# Patient Record
Sex: FEMALE | Race: WHITE | NOT HISPANIC OR LATINO | URBAN - METROPOLITAN AREA
[De-identification: names, ages, dates, MRNs, and addresses within clinical notes are randomized per-mention and may not be internally consistent; named-entity substitution may affect disease eponyms.]

---

## 2022-05-24 ENCOUNTER — APPOINTMENT (OUTPATIENT)
Age: 27
Setting detail: DERMATOLOGY
End: 2022-05-24

## 2022-05-24 ENCOUNTER — APPOINTMENT (RX ONLY)
Age: 27
Setting detail: DERMATOLOGY
End: 2022-05-24

## 2022-05-24 VITALS
SYSTOLIC BLOOD PRESSURE: 120 MMHG | DIASTOLIC BLOOD PRESSURE: 70 MMHG | DIASTOLIC BLOOD PRESSURE: 70 MMHG | SYSTOLIC BLOOD PRESSURE: 120 MMHG

## 2022-05-24 DIAGNOSIS — L81.4 OTHER MELANIN HYPERPIGMENTATION: ICD-10-CM

## 2022-05-24 DIAGNOSIS — L81.1 CHLOASMA: ICD-10-CM

## 2022-05-24 PROCEDURE — ? TREATMENT REGIMEN

## 2022-05-24 PROCEDURE — ? PRESCRIPTION

## 2022-05-24 PROCEDURE — 99203 OFFICE O/P NEW LOW 30 MIN: CPT

## 2022-05-24 PROCEDURE — ? COUNSELING

## 2022-05-24 RX ORDER — FLUOCINOLONE ACETONIDE, HYDROQUINONE, AND TRETINOIN .1; 40; .5 MG/G; MG/G; MG/G
1 CREAM TOPICAL QHS
Qty: 30 | Refills: 1 | Status: ERX | COMMUNITY
Start: 2022-05-24

## 2022-05-24 RX ADMIN — FLUOCINOLONE ACETONIDE, HYDROQUINONE, AND TRETINOIN 1: .1; 40; .5 CREAM TOPICAL at 00:00

## 2022-05-24 ASSESSMENT — LOCATION SIMPLE DESCRIPTION DERM
LOCATION SIMPLE: UPPER LIP
LOCATION SIMPLE: RIGHT CHEEK
LOCATION SIMPLE: RIGHT FOREHEAD
LOCATION SIMPLE: UPPER LIP
LOCATION SIMPLE: RIGHT FOREHEAD
LOCATION SIMPLE: LEFT CHEEK
LOCATION SIMPLE: RIGHT CHEEK
LOCATION SIMPLE: LEFT CHEEK

## 2022-05-24 ASSESSMENT — LOCATION ZONE DERM
LOCATION ZONE: LIP
LOCATION ZONE: FACE
LOCATION ZONE: LIP
LOCATION ZONE: FACE

## 2022-05-24 ASSESSMENT — LOCATION DETAILED DESCRIPTION DERM
LOCATION DETAILED: LEFT CENTRAL MALAR CHEEK
LOCATION DETAILED: RIGHT INFERIOR CENTRAL MALAR CHEEK
LOCATION DETAILED: RIGHT MEDIAL FOREHEAD
LOCATION DETAILED: RIGHT INFERIOR CENTRAL MALAR CHEEK
LOCATION DETAILED: RIGHT MEDIAL FOREHEAD
LOCATION DETAILED: PHILTRUM
LOCATION DETAILED: LEFT INFERIOR MEDIAL MALAR CHEEK
LOCATION DETAILED: LEFT INFERIOR MEDIAL MALAR CHEEK
LOCATION DETAILED: LEFT CENTRAL MALAR CHEEK
LOCATION DETAILED: PHILTRUM

## 2022-05-24 NOTE — HPI: DISCOLORATION
How Severe Is Your Skin Discoloration?: mild
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

## 2022-08-26 ENCOUNTER — APPOINTMENT (RX ONLY)
Age: 27
Setting detail: DERMATOLOGY
End: 2022-08-26

## 2022-08-26 ENCOUNTER — APPOINTMENT (OUTPATIENT)
Age: 27
Setting detail: DERMATOLOGY
End: 2022-08-26

## 2022-08-26 VITALS
SYSTOLIC BLOOD PRESSURE: 100 MMHG | DIASTOLIC BLOOD PRESSURE: 70 MMHG | SYSTOLIC BLOOD PRESSURE: 100 MMHG | DIASTOLIC BLOOD PRESSURE: 70 MMHG

## 2022-08-26 DIAGNOSIS — Z41.9 ENCOUNTER FOR PROCEDURE FOR PURPOSES OTHER THAN REMEDYING HEALTH STATE, UNSPECIFIED: ICD-10-CM

## 2022-08-26 DIAGNOSIS — L81.1 CHLOASMA: ICD-10-CM | Status: RESOLVING

## 2022-08-26 PROCEDURE — ? COUNSELING

## 2022-08-26 PROCEDURE — ? TREATMENT REGIMEN

## 2022-08-26 PROCEDURE — ? PRODUCT LINE (ZO SKIN HEALTH)

## 2022-08-26 PROCEDURE — ? PRESCRIPTION

## 2022-08-26 PROCEDURE — 99213 OFFICE O/P EST LOW 20 MIN: CPT

## 2022-08-26 RX ORDER — HYDROQUINONE 4% 4 G/100G
EMULSION TOPICAL BID
Qty: 2 | Refills: 0 | Status: ACTIVE

## 2022-08-26 RX ORDER — TRETIONIN 0.5 MG/G
CREAM TOPICAL QHS
Qty: 20 | Refills: 0 | Status: ACTIVE

## 2022-08-26 ASSESSMENT — LOCATION DETAILED DESCRIPTION DERM
LOCATION DETAILED: RIGHT INFERIOR CENTRAL MALAR CHEEK
LOCATION DETAILED: INFERIOR MID FOREHEAD
LOCATION DETAILED: RIGHT CENTRAL MALAR CHEEK
LOCATION DETAILED: INFERIOR MID FOREHEAD
LOCATION DETAILED: LEFT CENTRAL MALAR CHEEK
LOCATION DETAILED: RIGHT INFERIOR CENTRAL MALAR CHEEK
LOCATION DETAILED: RIGHT CENTRAL MALAR CHEEK
LOCATION DETAILED: LEFT CENTRAL MALAR CHEEK

## 2022-08-26 ASSESSMENT — LOCATION SIMPLE DESCRIPTION DERM
LOCATION SIMPLE: RIGHT CHEEK
LOCATION SIMPLE: INFERIOR FOREHEAD
LOCATION SIMPLE: RIGHT CHEEK
LOCATION SIMPLE: RIGHT CHEEK
LOCATION SIMPLE: LEFT CHEEK
LOCATION SIMPLE: LEFT CHEEK
LOCATION SIMPLE: INFERIOR FOREHEAD
LOCATION SIMPLE: RIGHT CHEEK

## 2022-08-26 ASSESSMENT — LOCATION ZONE DERM
LOCATION ZONE: FACE

## 2022-08-26 NOTE — PROCEDURE: PRODUCT LINE ([facility])
Product 32 Price (In Dollars - Numeric Only, No Special Characters Or $): 0.00
Product 12 Units: 0
Name Of Product 18: ZO SPF Primer
Name Of Product 10: Complexion Renewal Kit
Product 1 Units: 1
Name Of Product 35: Tretinoin 0.1%
Product 1 Application Directions: Wash BID
Product 37 Application Directions: Apply [facility]
Name Of Product 4: Pigment control HQ4% RX
Name Of Product 27: SPF Brush 40 medium
Product 29 Application Directions: Apply to entire face QAM and reapply as needed.
Name Of Product 21: Oil Control Pads
Assigning Risk Information: Per AMA, level of risk is based upon consequences of the problem(s) addressed at the encounter when appropriately treated. Risk also includes medical decision making related to the need to initiate or forego further testing, treatment and/or hospitalization. Over the counter medication are assigned a risk level of low. Prescription medication management is assigned a risk level of moderate.
Name Of Product 2: Exfoliating polish
Product 15 Application Directions: Apply to face every night
Product 32 Application Directions: Apply to entire face QOS or alternating with Retinol
Name Of Product 13: Radical night repair
Name Of Product 30: Growth Factor Serum Eye
Risk Of Complication Category: Moderate (Prescription Medication Management)
Product 7 Application Directions: Apply to entire face BID
Product 24 Application Directions: Apply to entire face BID after cleansing
Name Of Product 33: Complexion clearing mask
Product 18 Application Directions: Apply to entire face QAM
Name Of Product 16: Wrinkle Texture Repair
Name Of Product 8: Exfoliating accelerator
Product 35 Application Directions: Apply to entire face QHS PRN
Allow Plan To Count Towards E/M Coding: No (this will remove associated impression from E/M calculation)
Product 10 Application Directions: Apply to entire face BID as directed
Product 21 Application Directions: Swab BID after cleansing
Product 4 Application Directions: Apply to entire face for pigmentation QAM
Name Of Product 25: Hydrafirm / Brightening Eye Cream
Name Of Product 19: Tretinoin 0.05% (20g)
Product 2 Application Directions: 3 to 5 x week after cleansing
Name Of Product 5: Vitamin C serum
Name Of Product 28: Hydrating Cream
Product 13 Application Directions: Apply to entire face QHS after cleansing
Name Of Product 36: Enzymatic peel
Name Of Product 11: Firming Serum
Product 22 Application Directions: Apply to eyelids and crows feet BID
Name Of Product 22: Intense Eye Repair
Send Charges To Patient Encounter: No
Product 30 Application Directions: Apply BID to periorbital
Product 16 Application Directions: Apply to entire face QHS as tolerated
Product 33 Application Directions: 1-2 x week to T-zone
Name Of Product 14: Dual Action Scrub
Name Of Product 3: Rozatrol
Name Of Product 31: Wrinkle and Texture Repair
Product 8 Application Directions: Apply QAM
Name Of Product 23: Gentle cleanser travel size
Product 25 Application Directions: Apply to periorbital region QHS
Name Of Product 34: Illuminating AOX Serum
Product 19 Application Directions: Apply 2 x 3 nights a week as tolerated
Name Of Product 17: Complexion renewal pads
Name Of Product 9: Pigment HQ4% blending RX
Product 36 Application Directions: QHS as tolerated
Product 11 Application Directions: Apply BID
Product 28 Application Directions: Apply to entire face QOS PRN alternating with Retin A
Name Of Product 20: Retinol Brightener
Name Of Product 26: Kellieve
Name Of Product 1: Gentle cleanser
Name Of Product 37: Renewal cream
Product 14 Application Directions: Scrub Every other day after cleansing
Name Of Product 29: ZO Broad SPF 50
Name Of Product 12: Exfoliating polish travel size
Name Of Product 6: Daily Power Defense
Product 6 Application Directions: Apply to entire face twice daily after pads.
Product 23 Application Directions: Cleanse face BID
Name Of Product 15: Growth Factor Serum
Product 3 Application Directions: Apply to entire face BID after Daily Power Defense
Product 34 Application Directions: Apply to entire face over DPD and right before SPF
Name Of Product 7: Instant Pore refiner
Detail Level: Zone
Name Of Product 24: Calming toner
Product 26 Application Directions: Apply to entire face BID after pads
Product 20 Application Directions: Apply to entire face QHS

## 2022-08-26 NOTE — PROCEDURE: PRODUCT LINE ([facility])
Product 2 Price (In Dollars - Numeric Only, No Special Characters Or $): 0.00
Allow Plan To Count Towards E/M Coding: No (this will remove associated impression from E/M calculation)
Render Product Pricing In Note: No
Product 48 Units: 0
Product 8 Application Directions: Apply QAM
Product 32 Application Directions: Apply to entire face QOS or alternating with Retinol
Product 13 Application Directions: Apply to entire face QHS after cleansing
Name Of Product 22: Intense Eye Repair
Product 21 Application Directions: Swab BID after cleansing
Product 31 Application Directions: Apply \[facility]\""
Product 2 Units: 1
Name Of Product 20: Retinol Brightener
Assigning Risk Information: Per AMA, level of risk is based upon consequences of the problem(s) addressed at the encounter when appropriately treated. Risk also includes medical decision making related to the need to initiate or forego further testing, treatment and/or hospitalization. Over the counter medication are assigned a risk level of low. Prescription medication management is assigned a risk level of moderate.
Product 10 Application Directions: Apply to entire face BID as directed
Product 6 Application Directions: Apply to entire face twice daily after pads.
Product 19 Application Directions: Apply 2 x 3 nights a week as tolerated
Name Of Product 12: Exfoliating polish travel size
Name Of Product 35: Tretinoin 0.1%
Product 29 Application Directions: Apply to entire face QAM and reapply as needed.
Product 35 Application Directions: Apply to entire face QHS PRN
Product 22 Application Directions: Apply to eyelids and crows feet BID
Product 7 Application Directions: Apply to entire face BID
Product 30 Application Directions: Apply BID to periorbital
Name Of Product 33: Complexion clearing mask
Product 15 Application Directions: Apply to face every night
Product 34 Application Directions: Apply to entire face over DPD and right before SPF
Product 20 Application Directions: Apply to entire face QHS
Product 1 Application Directions: Wash BID
Name Of Product 32: Growth Factor Serum
Name Of Product 1: Gentle cleanser
Product 4 Application Directions: Apply to entire face for pigmentation QAM
Name Of Product 13: Radical night repair
Product 24 Application Directions: Apply to entire face BID after cleansing
Product 23 Application Directions: Cleanse face BID
Name Of Product 17: Complexion renewal pads
Name Of Product 2: Exfoliating polish
Name Of Product 14: Dual Action Scrub
Product 11 Application Directions: Apply BID
Product 27 Application Directions: Apply to entire face QAM
Name Of Product 6: Daily Power Defense
Product 33 Application Directions: 1-2 x week to T-zone
Name Of Product 10: Complexion Renewal Kit
Name Of Product 19: Tretinoin 0.05% (20g)
Name Of Product 29: ZO Broad SPF 50
Name Of Product 23: Gentle cleanser travel size
Name Of Product 27: SPF Brush 40 medium
Name Of Product 5: Vitamin C serum
Name Of Product 30: Growth Factor Serum Eye
Name Of Product 26: Marleeve
Detail Level: Zone
Name Of Product 31: Wrinkle and Texture Repair
Name Of Product 21: Oil Control Pads
Name Of Product 18: ZO SPF Primer
Product 16 Application Directions: Apply to entire face QHS as tolerated
Name Of Product 37: Renewal cream
Risk Of Complication Category: Moderate (Prescription Medication Management)
Product 3 Application Directions: Apply to entire face BID after Daily Power Defense
Product 28 Application Directions: Apply to entire face QOS PRN alternating with Retin A
Name Of Product 28: Hydrating Cream
Product 26 Application Directions: Apply to entire face BID after pads
Name Of Product 3: Rozatrol
Name Of Product 9: Pigment HQ4% blending RX
Name Of Product 4: Pigment control HQ4% RX
Product 2 Application Directions: 3 to 5 x week after cleansing
Product 14 Application Directions: Scrub Every other day after cleansing
Name Of Product 36: Enzymatic peel
Name Of Product 8: Exfoliating accelerator
Name Of Product 34: Illuminating AOX Serum
Name Of Product 7: Instant Pore refiner
Product 36 Application Directions: QHS as tolerated
Name Of Product 11: Firming Serum
Name Of Product 16: Wrinkle Texture Repair
Product 25 Application Directions: Apply to periorbital region QHS
Name Of Product 24: Calming toner
Name Of Product 25: Hydrafirm / Brightening Eye Cream

## 2022-08-26 NOTE — PROCEDURE: TREATMENT REGIMEN
Plan: Patient has an appointment with Bre Tijerina,  at 10:00am this morning. Patient showed  photos of what her melasma looked like prior to starting the tri-kahlil. Good improvement.
Detail Level: Zone

## 2022-10-28 ENCOUNTER — APPOINTMENT (RX ONLY)
Age: 27
Setting detail: DERMATOLOGY
End: 2022-10-28

## 2022-10-28 ENCOUNTER — APPOINTMENT (OUTPATIENT)
Age: 27
Setting detail: DERMATOLOGY
End: 2022-10-28

## 2022-10-28 DIAGNOSIS — Z41.9 ENCOUNTER FOR PROCEDURE FOR PURPOSES OTHER THAN REMEDYING HEALTH STATE, UNSPECIFIED: ICD-10-CM

## 2022-10-28 PROCEDURE — ? PRODUCT LINE (ZO SKIN HEALTH)

## 2022-10-28 PROCEDURE — ? PRESCRIPTION

## 2022-10-28 RX ORDER — TRETIONIN 0.5 MG/G
CREAM TOPICAL QHS
Qty: 20 | Refills: 0 | Status: ACTIVE

## 2022-10-28 ASSESSMENT — LOCATION ZONE DERM
LOCATION ZONE: FACE
LOCATION ZONE: FACE

## 2022-10-28 ASSESSMENT — LOCATION DETAILED DESCRIPTION DERM
LOCATION DETAILED: INFERIOR MID FOREHEAD
LOCATION DETAILED: LEFT INFERIOR CENTRAL MALAR CHEEK
LOCATION DETAILED: LEFT CENTRAL MALAR CHEEK
LOCATION DETAILED: LEFT CENTRAL MALAR CHEEK
LOCATION DETAILED: RIGHT INFERIOR CENTRAL MALAR CHEEK
LOCATION DETAILED: INFERIOR MID FOREHEAD
LOCATION DETAILED: LEFT MEDIAL FOREHEAD
LOCATION DETAILED: LEFT INFERIOR CENTRAL MALAR CHEEK
LOCATION DETAILED: RIGHT INFERIOR CENTRAL MALAR CHEEK
LOCATION DETAILED: LEFT MEDIAL FOREHEAD
LOCATION DETAILED: RIGHT CENTRAL MALAR CHEEK
LOCATION DETAILED: RIGHT CENTRAL MALAR CHEEK

## 2022-10-28 ASSESSMENT — LOCATION SIMPLE DESCRIPTION DERM
LOCATION SIMPLE: LEFT CHEEK
LOCATION SIMPLE: LEFT CHEEK
LOCATION SIMPLE: LEFT FOREHEAD
LOCATION SIMPLE: RIGHT CHEEK
LOCATION SIMPLE: INFERIOR FOREHEAD
LOCATION SIMPLE: INFERIOR FOREHEAD
LOCATION SIMPLE: RIGHT CHEEK
LOCATION SIMPLE: LEFT FOREHEAD

## 2022-10-28 NOTE — PROCEDURE: PRODUCT LINE ([facility])
Product 11 Price (In Dollars - Numeric Only, No Special Characters Or $): 0.00
Name Of Product 33: Complexion clearing mask
Product 19 Units: 0
Render Product Pricing In Note: No
Product 5 Application Directions: Apply QAM
Name Of Product 14: Dual Action Scrub
Product 35 Application Directions: Apply to entire face QHS PRN
Name Of Product 17: Complexion renewal pads
Product 24 Application Directions: Apply to entire face BID after cleansing
Product 19 Application Directions: Apply 2 x 3 nights a week as tolerated
Product 27 Application Directions: Apply to entire face QAM
Name Of Product 6: Daily Power Defense
Name Of Product 36: Enzymatic peel
Product 11 Application Directions: Apply BID
Name Of Product 20: Retinol Brightener
Name Of Product 1: Gentle cleanser
Product 22 Application Directions: Apply to eyelids and crows feet BID
Name Of Product 25: Hydrafirm / Brightening Eye Cream
Product 30 Application Directions: Apply to entire face at night alternating with Tretinoin 0.05%
Name Of Product 9: Pigment HQ4% blending RX
Product 3 Application Directions: Apply to entire face BID after Daily Power Defense
Name Of Product 28: Hydrating Cream
Product 33 Application Directions: 1-2 x week to T-zone
Name Of Product 12: Exfoliating polish travel size
Product 14 Application Directions: Scrub Every other day after cleansing
Name Of Product 31: Wrinkle and Texture Repair
Name Of Product 23: Gentle cleanser travel size
Product 17 Units: 1
Name Of Product 4: Pigment control HQ4% RX
Detail Level: Zone
Name Of Product 15: Growth Factor Eye Serum
Product 4 Application Directions: Apply to entire face for pigmentation QAM
Name Of Product 34: Illuminating AOX Serum
Product 36 Application Directions: Apply to entire face QAM on top of the Pigment Control.
Product 6 Application Directions: Apply to entire face twice daily after pads.
Product 20 Application Directions: Apply to entire face QHS
Product 25 Application Directions: Apply to periorbital region QHS
Product 1 Application Directions: Wash BID
Product 9 Application Directions: Apply HS
Name Of Product 18: ZO SPF Primer
Name Of Product 5: Vitamin C serum
Name Of Product 37: Renewal cream
Name Of Product 7: Instant Pore refiner
Product 28 Application Directions: Apply to entire face QOS PRN alternating with Retin A
Assigning Risk Information: Per AMA, level of risk is based upon consequences of the problem(s) addressed at the encounter when appropriately treated. Risk also includes medical decision making related to the need to initiate or forego further testing, treatment and/or hospitalization. Over the counter medication are assigned a risk level of low. Prescription medication management is assigned a risk level of moderate.
Name Of Product 26: Kellieve
Name Of Product 21: Oil Control Pads
Name Of Product 10: Complexion Renewal Kit
Name Of Product 29: ZO Broad SPF 50
Name Of Product 2: Exfoliating polish
Product 23 Application Directions: Cleanse face BID
Risk Of Complication Category: No MDM
Product 34 Application Directions: Apply to entire face over DPD and right before SPF
Name Of Product 13: Radical night repair
Product 15 Application Directions: Apply to periorbital region every night
Name Of Product 32: Growth Factor Serum
Name Of Product 24: Calming toner
Allow Plan To Count Towards E/M Coding: No (this will remove associated impression from E/M calculation)
Product 7 Application Directions: Apply to entire face BID
Name Of Product 16: Wrinkle Texture Repair
Name Of Product 35: Tretinoin 0.1%
Product 26 Application Directions: Apply to entire face BID after pads
Product 21 Application Directions: Swab BID after cleansing
Product 29 Application Directions: Apply to entire face QAM and reapply as needed.
Product 2 Application Directions: Scrub 5 x a week after cleansing
Name Of Product 8: Exfoliating accelerator
Product 10 Application Directions: Apply to entire face BID as directed
Name Of Product 19: Tretinoin 0.05% (20g)
Name Of Product 27: SPF Brush 40 medium
Name Of Product 11: Firming Serum
Name Of Product 22: Intense Eye Repair
Product 32 Application Directions: Apply to entire face QOS or alternating with Retinol
Name Of Product 3: Rozatrol
Product 16 Application Directions: Apply to entire face QHS as tolerated

## 2022-10-28 NOTE — PROCEDURE: PRODUCT LINE ([facility])
Product 27 Units: 0
Name Of Product 7: Instant Pore refiner
Name Of Product 35: Tretinoin 0.1%
Product 28 Price (In Dollars - Numeric Only, No Special Characters Or $): 0.00
Product 31 Application Directions: Apply [facility]
Name Of Product 27: SPF Brush 40 medium
Name Of Product 23: Gentle cleanser travel size
Allow Plan To Count Towards E/M Coding: No (this will remove associated impression from E/M calculation)
Product 4 Application Directions: Apply to entire face for pigmentation QAM
Name Of Product 12: Exfoliating polish travel size
Name Of Product 19: Tretinoin 0.05% (20g)
Name Of Product 36: Enzymatic peel
Name Of Product 33: Complexion clearing mask
Product 27 Application Directions: Apply to entire face QAM
Product 17 Units: 1
Product 36 Application Directions: Apply to entire face QAM on top of the Pigment Control.
Name Of Product 11: Firming Serum
Product 8 Application Directions: Apply QAM
Product 30 Application Directions: Apply to entire face at night alternating with Tretinoin 0.05%
Name Of Product 22: Intense Eye Repair
Name Of Product 28: Hydrating Cream
Send Charges To Patient Encounter: No
Name Of Product 8: Exfoliating accelerator
Name Of Product 17: Complexion renewal pads
Name Of Product 31: Wrinkle and Texture Repair
Product 21 Application Directions: Swab BID after cleansing
Product 1 Application Directions: Wash BID
Product 26 Application Directions: Apply to entire face BID after pads
Product 20 Application Directions: Apply to entire face QHS
Name Of Product 30: Renewal cream
Name Of Product 10: Complexion Renewal Kit
Name Of Product 13: Radical night repair
Detail Level: Zone
Name Of Product 25: Hydrafirm / Brightening Eye Cream
Name Of Product 3: Rozatrol
Product 17 Application Directions: Apply to entire face BID after cleansing
Name Of Product 6: Daily Power Defense
Product 6 Application Directions: Apply to entire face twice daily after pads.
Name Of Product 4: Pigment control HQ4% RX
Product 34 Application Directions: Apply to entire face over DPD and right before SPF
Name Of Product 21: Oil Control Pads
Name Of Product 18: ZO SPF Primer
Product 33 Application Directions: 1-2 x week to T-zone
Product 25 Application Directions: Apply to periorbital region QHS
Name Of Product 24: Calming toner
Product 29 Application Directions: Apply to entire face QAM and reapply as needed.
Name Of Product 16: Wrinkle Texture Repair
Product 22 Application Directions: Apply to eyelids and crows feet BID
Name Of Product 9: Pigment HQ4% blending RX
Name Of Product 5: Vitamin C serum
Product 35 Application Directions: Apply to entire face QHS PRN
Product 15 Application Directions: Apply to periorbital region every night
Name Of Product 26: Marleeve
Product 7 Application Directions: Apply to entire face BID
Name Of Product 20: Retinol Brightener
Product 14 Application Directions: Scrub Every other day after cleansing
Product 11 Application Directions: Apply BID
Name Of Product 29: ZO Broad SPF 50
Product 32 Application Directions: Apply to entire face QOS or alternating with Retinol
Product 28 Application Directions: Apply to entire face QOS PRN alternating with Retin A
Product 19 Application Directions: Apply 2 x 3 nights a week as tolerated
Name Of Product 2: Exfoliating polish
Product 2 Application Directions: Scrub 5 x a week after cleansing
Name Of Product 34: Illuminating AOX Serum
Product 10 Application Directions: Apply to entire face BID as directed
Risk Of Complication Category: No MDM
Name Of Product 1: Gentle cleanser
Name Of Product 14: Dual Action Scrub
Assigning Risk Information: Per AMA, level of risk is based upon consequences of the problem(s) addressed at the encounter when appropriately treated. Risk also includes medical decision making related to the need to initiate or forego further testing, treatment and/or hospitalization. Over the counter medication are assigned a risk level of low. Prescription medication management is assigned a risk level of moderate.
Product 3 Application Directions: Apply to entire face BID after Daily Power Defense
Product 16 Application Directions: Apply to entire face QHS as tolerated
Name Of Product 32: Growth Factor Serum
Product 23 Application Directions: Cleanse face BID
Name Of Product 15: Growth Factor Eye Serum

## 2022-12-16 ENCOUNTER — APPOINTMENT (OUTPATIENT)
Age: 27
Setting detail: DERMATOLOGY
End: 2022-12-16

## 2022-12-16 ENCOUNTER — APPOINTMENT (RX ONLY)
Age: 27
Setting detail: DERMATOLOGY
End: 2022-12-16

## 2022-12-16 DIAGNOSIS — Z41.9 ENCOUNTER FOR PROCEDURE FOR PURPOSES OTHER THAN REMEDYING HEALTH STATE, UNSPECIFIED: ICD-10-CM

## 2022-12-16 PROCEDURE — ? PRODUCT LINE (ZO SKIN HEALTH)

## 2022-12-16 PROCEDURE — ? TREATMENT REGIMEN

## 2022-12-16 ASSESSMENT — LOCATION DETAILED DESCRIPTION DERM
LOCATION DETAILED: LEFT MENTAL CREASE
LOCATION DETAILED: LEFT LATERAL BUCCAL CHEEK
LOCATION DETAILED: LEFT INFERIOR MEDIAL FOREHEAD
LOCATION DETAILED: RIGHT CENTRAL MALAR CHEEK
LOCATION DETAILED: LEFT INFERIOR MEDIAL MALAR CHEEK
LOCATION DETAILED: LEFT MENTAL CREASE
LOCATION DETAILED: LEFT INFERIOR MEDIAL FOREHEAD
LOCATION DETAILED: LEFT LATERAL BUCCAL CHEEK
LOCATION DETAILED: LEFT INFERIOR MEDIAL MALAR CHEEK
LOCATION DETAILED: RIGHT LATERAL BUCCAL CHEEK
LOCATION DETAILED: RIGHT CENTRAL MALAR CHEEK
LOCATION DETAILED: RIGHT LATERAL BUCCAL CHEEK

## 2022-12-16 ASSESSMENT — LOCATION SIMPLE DESCRIPTION DERM
LOCATION SIMPLE: CHIN
LOCATION SIMPLE: LEFT FOREHEAD
LOCATION SIMPLE: LEFT CHEEK
LOCATION SIMPLE: LEFT FOREHEAD
LOCATION SIMPLE: LEFT CHEEK
LOCATION SIMPLE: RIGHT CHEEK
LOCATION SIMPLE: RIGHT CHEEK
LOCATION SIMPLE: CHIN

## 2022-12-16 ASSESSMENT — LOCATION ZONE DERM
LOCATION ZONE: FACE
LOCATION ZONE: FACE

## 2022-12-16 NOTE — PROCEDURE: PRODUCT LINE ([facility])
Product 36 Price (In Dollars - Numeric Only, No Special Characters Or $): 0.00
Name Of Product 10: Complexion Renewal Kit
Product 1 Application Directions: Wash BID
Product 10 Application Directions: Apply to entire face BID as directed
Name Of Product 37: Renewal cream
Product 28 Units: 0
Name Of Product 28: Hydrating Cream
Product 18 Application Directions: Apply to entire face QAM
Name Of Product 20: Retinol Brightener
Name Of Product 4: Pigment control HQ4% RX
Name Of Product 8: Exfoliating accelerator
Name Of Product 6: Daily Power Defense
Product 21 Application Directions: Swab BID after cleansing
Name Of Product 12: Exfoliating polish travel size
Name Of Product 27: SPF Brush 40 medium
Name Of Product 1: Gentle cleanser
Product 20 Application Directions: Apply to entire face QHS
Send Charges To Patient Encounter: No
Product 2 Application Directions: Scrub 5 x a week after cleansing
Product 23 Application Directions: Cleanse face BID
Name Of Product 19: Tretinoin 0.05% (20g)
Product 31 Application Directions: Apply [facility]
Assigning Risk Information: Per AMA, level of risk is based upon consequences of the problem(s) addressed at the encounter when appropriately treated. Risk also includes medical decision making related to the need to initiate or forego further testing, treatment and/or hospitalization. Over the counter medication are assigned a risk level of low. Prescription medication management is assigned a risk level of moderate.
Name Of Product 33: Complexion clearing mask
Product 14 Application Directions: Scrub Every other day after cleansing
Name Of Product 9: Pigment HQ4% blending RX
Name Of Product 16: Wrinkle Texture Repair
Allow Plan To Count Towards E/M Coding: No (this will remove associated impression from E/M calculation)
Name Of Product 24: Calming toner
Product 24 Application Directions: Apply to entire face BID after cleansing
Name Of Product 15: Growth Factor Eye Serum
Product 5 Application Directions: Apply QAM
Product 30 Application Directions: Apply to entire face at night alternating with Tretinoin 0.05%
Product 32 Application Directions: Apply to entire face QOS or alternating with Retinol
Product 19 Application Directions: Apply 2 x 3 nights a week as tolerated
Name Of Product 22: Intense Eye Repair
Name Of Product 2: Exfoliating polish
Product 3 Application Directions: Apply to entire face BID after Daily Power Defense
Product 25 Application Directions: Apply to periorbital region QHS
Name Of Product 13: Radical night repair
Product 36 Application Directions: Apply to entire face QAM on top of the Pigment Control.
Name Of Product 26: Marleeve
Name Of Product 3: Rozatrol
Name Of Product 32: Growth Factor Serum
Name Of Product 25: Hydrafirm / Brightening Eye Cream
Product 33 Application Directions: 1-2 x week to T-zone
Product 16 Application Directions: Apply to entire face QHS as tolerated
Product 22 Application Directions: Apply to eyelids and crows feet BID
Name Of Product 21: Oil Control Pads
Product 11 Application Directions: Apply BID
Name Of Product 17: Complexion renewal pads
Risk Of Complication Category: No MDM
Product 15 Application Directions: Apply to periorbital region every night
Name Of Product 29: ZO Broad SPF 50
Product 35 Application Directions: Apply to entire face QHS PRN
Product 4 Application Directions: Apply to entire face for pigmentation QAM
Detail Level: Zone
Name Of Product 18: ZO SPF Primer
Name Of Product 5: Vitamin C serum
Product 7 Application Directions: Apply to entire face BID
Name Of Product 36: Enzymatic peel
Name Of Product 11: Firming Serum
Product 34 Application Directions: Apply to entire face over DPD and right before SPF
Name Of Product 23: Gentle cleanser travel size
Product 16 Units: 1
Product 6 Application Directions: Apply to entire face twice daily after pads.
Name Of Product 31: Wrinkle and Texture Repair
Name Of Product 35: Tretinoin 0.1%
Name Of Product 7: Instant Pore refiner
Product 26 Application Directions: Apply to entire face BID after pads
Name Of Product 34: Illuminating AOX Serum
Name Of Product 14: Dual Action Scrub
Product 29 Application Directions: Apply to entire face QAM and reapply as needed.
Product 28 Application Directions: Apply to entire face QOS PRN alternating with Retin A

## 2022-12-16 NOTE — PROCEDURE: TREATMENT REGIMEN
Initiate Treatment: Hydroquinone transition to be started now. Protocol given.
Plan: Start acne medicated mask 3 x a week for the first 2 weeks, then 2 x a week.
Detail Level: Zone

## 2022-12-16 NOTE — PROCEDURE: TREATMENT REGIMEN
Initiate Treatment: Hydroquinone transition to be started now. Protocol given.
Detail Level: Zone
Plan: Start acne medicated mask 3 x a week for the first 2 weeks, then 2 x a week.

## 2022-12-16 NOTE — PROCEDURE: PRODUCT LINE ([facility])
Product 9 Units: 0
Product 38 Price (In Dollars - Numeric Only, No Special Characters Or $): 0.00
Name Of Product 33: Complexion clearing mask
Name Of Product 21: Oil Control Pads
Product 9 Application Directions: Apply HS
Name Of Product 13: Radical night repair
Product 35 Application Directions: Apply to entire face QHS PRN
Product 25 Application Directions: Apply to periorbital region QHS
Product 1 Application Directions: Wash BID
Name Of Product 7: Instant Pore refiner
Product 15 Application Directions: Apply to periorbital region every night
Name Of Product 36: Enzymatic peel
Assigning Risk Information: Per AMA, level of risk is based upon consequences of the problem(s) addressed at the encounter when appropriately treated. Risk also includes medical decision making related to the need to initiate or forego further testing, treatment and/or hospitalization. Over the counter medication are assigned a risk level of low. Prescription medication management is assigned a risk level of moderate.
Name Of Product 10: Complexion Renewal Kit
Name Of Product 26: Kellieve
Product 18 Application Directions: Apply to entire face QAM
Name Of Product 2: Exfoliating polish
Name Of Product 16: Wrinkle Texture Repair
Product 4 Application Directions: Apply to entire face for pigmentation QAM
Risk Of Complication Category: No MDM
Name Of Product 31: Wrinkle and Texture Repair
Product 28 Application Directions: Apply to entire face QOS PRN alternating with Retin A
Product 33 Application Directions: 1-2 x week to T-zone
Name Of Product 19: Tretinoin 0.05% (20g)
Product 13 Application Directions: Apply to entire face QHS
Name Of Product 5: Vitamin C serum
Product 23 Application Directions: Cleanse face BID
Allow Plan To Count Towards E/M Coding: No (this will remove associated impression from E/M calculation)
Product 21 Application Directions: Swab BID after cleansing
Product 7 Application Directions: Apply to entire face BID
Name Of Product 29: ZO Broad SPF 50
Name Of Product 34: Illuminating AOX Serum
Product 16 Units: 1
Name Of Product 24: Calming toner
Name Of Product 22: Intense Eye Repair
Product 16 Application Directions: Apply to entire face QHS as tolerated
Name Of Product 8: Exfoliating accelerator
Name Of Product 14: Dual Action Scrub
Product 36 Application Directions: Apply to entire face QAM on top of the Pigment Control.
Product 26 Application Directions: Apply to entire face BID after pads
Product 2 Application Directions: Scrub 5 x a week after cleansing
Product 10 Application Directions: Apply to entire face BID as directed
Name Of Product 37: Renewal cream
Send Charges To Patient Encounter: No
Product 19 Application Directions: Apply 2 x 3 nights a week as tolerated
Name Of Product 17: Complexion renewal pads
Name Of Product 27: SPF Brush 40 medium
Name Of Product 11: Firming Serum
Name Of Product 3: Rozatrol
Product 5 Application Directions: Apply QAM
Product 29 Application Directions: Apply to entire face QAM and reapply as needed.
Product 34 Application Directions: Apply to entire face over DPD and right before SPF
Name Of Product 32: Growth Factor Serum
Name Of Product 20: Retinol Brightener
Product 24 Application Directions: Apply to entire face BID after cleansing
Product 22 Application Directions: Apply to eyelids and crows feet BID
Name Of Product 6: Daily Power Defense
Product 14 Application Directions: Scrub Every other day after cleansing
Name Of Product 35: Tretinoin 0.1%
Product 3 Application Directions: Apply to entire face BID after Daily Power Defense
Product 11 Application Directions: Apply BID
Name Of Product 1: Gentle cleanser
Name Of Product 25: Hydrafirm / Brightening Eye Cream
Name Of Product 9: Pigment HQ4% blending RX
Name Of Product 15: Growth Factor Eye Serum
Name Of Product 23: Gentle cleanser travel size
Product 32 Application Directions: Apply to entire face QOS or alternating with Retinol
Name Of Product 18: ZO SPF Primer
Name Of Product 4: Pigment control HQ4% RX
Name Of Product 12: Exfoliating polish travel size
Detail Level: Zone
Product 6 Application Directions: Apply to entire face twice daily after pads.
Product 30 Application Directions: Apply to entire face at night alternating with Tretinoin 0.05%
Name Of Product 28: Hydrating Cream

## 2023-01-03 ENCOUNTER — APPOINTMENT (OUTPATIENT)
Age: 28
Setting detail: DERMATOLOGY
End: 2023-01-03

## 2023-01-03 ENCOUNTER — APPOINTMENT (RX ONLY)
Age: 28
Setting detail: DERMATOLOGY
End: 2023-01-03

## 2023-01-03 DIAGNOSIS — Z41.9 ENCOUNTER FOR PROCEDURE FOR PURPOSES OTHER THAN REMEDYING HEALTH STATE, UNSPECIFIED: ICD-10-CM

## 2023-01-03 PROCEDURE — ? CHEMICAL PEEL BETA SALICYLIC ACID

## 2023-01-03 ASSESSMENT — LOCATION SIMPLE DESCRIPTION DERM
LOCATION SIMPLE: RIGHT CHEEK
LOCATION SIMPLE: RIGHT CHEEK
LOCATION SIMPLE: INFERIOR FOREHEAD
LOCATION SIMPLE: LEFT CHEEK
LOCATION SIMPLE: LEFT CHEEK
LOCATION SIMPLE: INFERIOR FOREHEAD

## 2023-01-03 ASSESSMENT — LOCATION ZONE DERM
LOCATION ZONE: FACE
LOCATION ZONE: FACE

## 2023-01-03 ASSESSMENT — LOCATION DETAILED DESCRIPTION DERM
LOCATION DETAILED: INFERIOR MID FOREHEAD
LOCATION DETAILED: RIGHT CENTRAL MALAR CHEEK
LOCATION DETAILED: LEFT CENTRAL MALAR CHEEK
LOCATION DETAILED: INFERIOR MID FOREHEAD
LOCATION DETAILED: LEFT CENTRAL MALAR CHEEK
LOCATION DETAILED: RIGHT CENTRAL MALAR CHEEK

## 2023-01-03 NOTE — PROCEDURE: CHEMICAL PEEL BETA SALICYLIC ACID
Price (Use Numbers Only, No Special Characters Or $): 95
Time (Mins): 4
Consent: Prior to the procedure, written consent was obtained and risks were reviewed, including but not limited to: redness, peeling, blistering, pigmentary change, scarring, infection, and pain.
Detail Level: Zone
Post-Care Instructions: I reviewed with the patient in detail post-care instructions. Patient should avoid sun exposure and wear sun protection.
Beta Salicylic Acid %: 30%
Prep: The treated area was degreased with pre-peel cleansing.
Treatment Number: 1
Post Peel Care: The treatment area was neutralized and washed with soap and water, and a post-peel cream was applied followed by ZO Wrinkle and Texture Reapair. Sun protection and post-care instructions were reviewed with the patient.

## 2023-01-03 NOTE — PROCEDURE: CHEMICAL PEEL BETA SALICYLIC ACID
Detail Level: Zone
Prep: The treated area was degreased with pre-peel cleansing.
Post Peel Care: The treatment area was neutralized and washed with soap and water, and a post-peel cream was applied followed by ZO Wrinkle and Texture Reapair. Sun protection and post-care instructions were reviewed with the patient.
Consent: Prior to the procedure, written consent was obtained and risks were reviewed, including but not limited to: redness, peeling, blistering, pigmentary change, scarring, infection, and pain.
Price (Use Numbers Only, No Special Characters Or $): 95
Treatment Number: 1
Beta Salicylic Acid %: 30%
Post-Care Instructions: I reviewed with the patient in detail post-care instructions. Patient should avoid sun exposure and wear sun protection.
Time (Mins): 4
